# Patient Record
Sex: FEMALE | Race: WHITE | Employment: OTHER | URBAN - METROPOLITAN AREA
[De-identification: names, ages, dates, MRNs, and addresses within clinical notes are randomized per-mention and may not be internally consistent; named-entity substitution may affect disease eponyms.]

---

## 2020-10-15 ENCOUNTER — OFFICE VISIT (OUTPATIENT)
Dept: NEUROLOGY | Facility: CLINIC | Age: 65
End: 2020-10-15
Payer: MEDICARE

## 2020-10-15 ENCOUNTER — TELEPHONE (OUTPATIENT)
Dept: NEUROLOGY | Facility: CLINIC | Age: 65
End: 2020-10-15

## 2020-10-15 ENCOUNTER — HOSPITAL ENCOUNTER (OUTPATIENT)
Dept: GENERAL RADIOLOGY | Facility: HOSPITAL | Age: 65
Discharge: HOME | End: 2020-10-15
Attending: PHYSICAL MEDICINE & REHABILITATION
Payer: MEDICARE

## 2020-10-15 VITALS — WEIGHT: 150 LBS | BODY MASS INDEX: 25.61 KG/M2 | HEIGHT: 64 IN

## 2020-10-15 DIAGNOSIS — M70.50 PES ANSERINE BURSITIS: Primary | ICD-10-CM

## 2020-10-15 DIAGNOSIS — M17.11 LOCALIZED OSTEOARTHRITIS OF RIGHT KNEE: ICD-10-CM

## 2020-10-15 DIAGNOSIS — M22.2X1 PATELLOFEMORAL PAIN SYNDROME OF RIGHT KNEE: ICD-10-CM

## 2020-10-15 DIAGNOSIS — M25.561 CHRONIC PAIN OF RIGHT KNEE: ICD-10-CM

## 2020-10-15 DIAGNOSIS — G89.29 CHRONIC PAIN OF RIGHT KNEE: ICD-10-CM

## 2020-10-15 DIAGNOSIS — M70.50 PES ANSERINE BURSITIS: ICD-10-CM

## 2020-10-15 PROCEDURE — 99204 OFFICE O/P NEW MOD 45 MIN: CPT | Performed by: PHYSICAL MEDICINE & REHABILITATION

## 2020-10-15 PROCEDURE — 73564 X-RAY EXAM KNEE 4 OR MORE: CPT | Performed by: PHYSICAL MEDICINE & REHABILITATION

## 2020-10-15 RX ORDER — MULTIVIT-MIN/IRON FUM/FOLIC AC 7.5 MG-4
TABLET ORAL
COMMUNITY

## 2020-10-15 NOTE — H&P
[address] H&P    Requesting Physician: None Pcp    Chief Complaint (Reason for Visit):  Patient presents with:  Knee Pain: Pt is present with right knee pain and states that it has been going on for (MULTI-VITAMIN/MINERALS) Oral Tab Take  by mouth. • Diclofenac Sodium 1 % Transdermal Gel Apply 4 g topically 4 (four) times daily for 25 doses.  1 Tube 0        ALLERGIES:   Not on File      REVIEW OF SYSTEMS:   Review of Systems   Constitutional: Lela Reas line pain or \"catch\"      Gait:  Normal    Data  No results found for any previous visit.   ]      Radiology Imaging:    No image results found.       ASSESSMENT AND PLAN:  The patient is a 53-year-old female who presents with right-sided medial knee pain

## 2020-10-15 NOTE — TELEPHONE ENCOUNTER
Spoke to patient who stated that Dr. Nena Acevedo told her he wants to hold off on injection and try the medication first. So she will hold off on injection until further notice from Dr. Nena Acevedo.

## 2020-10-15 NOTE — PATIENT INSTRUCTIONS
1) Get XR of the right knee today on your way out    2) Use Voltaren gel 4 times per day over the affected area. Tylenol 500-1000 mg every 6-8 hours as needed for pain. No more than 3000 mg daily.   3) Begin formal physical therapy as soon as possible  4) irritation and gives the bursa time to heal.  · Sleeping with a cushion between the thighs . This limits pressure on the bursa. · Prescription or over-the-counter medicines . These help reduce inflammation, swelling, and pain.  NSAIDs (nonsteroidal anti-in

## 2020-10-15 NOTE — TELEPHONE ENCOUNTER
Medicare Online for insurance coverage of RIGHT pes anserine bursa CSI under ultrasound guidance. cpt codes 60150, [id number], ( if needed). iInsurance was verified and procedure is a covered benefit. Authorization is not required. Will inform Nursing.

## 2021-08-24 ENCOUNTER — TELEPHONE (OUTPATIENT)
Dept: NEUROLOGY | Facility: CLINIC | Age: 66
End: 2021-08-24

## 2021-08-25 ENCOUNTER — MED REC SCAN ONLY (OUTPATIENT)
Dept: NEUROLOGY | Facility: CLINIC | Age: 66
End: 2021-08-25

## 2021-08-25 ENCOUNTER — TELEPHONE (OUTPATIENT)
Dept: NEUROLOGY | Facility: CLINIC | Age: 66
End: 2021-08-25

## 2021-08-25 ENCOUNTER — OFFICE VISIT (OUTPATIENT)
Dept: NEUROLOGY | Facility: CLINIC | Age: 66
End: 2021-08-25
Payer: MEDICARE

## 2021-08-25 VITALS
BODY MASS INDEX: 25.61 KG/M2 | HEART RATE: 76 BPM | DIASTOLIC BLOOD PRESSURE: 64 MMHG | WEIGHT: 150 LBS | HEIGHT: 64 IN | SYSTOLIC BLOOD PRESSURE: 128 MMHG | OXYGEN SATURATION: 98 %

## 2021-08-25 DIAGNOSIS — M25.561 CHRONIC PAIN OF RIGHT KNEE: ICD-10-CM

## 2021-08-25 DIAGNOSIS — M17.11 LOCALIZED OSTEOARTHRITIS OF RIGHT KNEE: ICD-10-CM

## 2021-08-25 DIAGNOSIS — G89.29 CHRONIC PAIN OF RIGHT KNEE: ICD-10-CM

## 2021-08-25 DIAGNOSIS — M70.50 PES ANSERINE BURSITIS: Primary | ICD-10-CM

## 2021-08-25 DIAGNOSIS — M70.51 PES ANSERINUS BURSITIS OF RIGHT KNEE: ICD-10-CM

## 2021-08-25 DIAGNOSIS — M22.2X1 PATELLOFEMORAL PAIN SYNDROME OF RIGHT KNEE: ICD-10-CM

## 2021-08-25 DIAGNOSIS — M67.979 TIBIALIS POSTERIOR TENDINOPATHY: ICD-10-CM

## 2021-08-25 PROCEDURE — 99214 OFFICE O/P EST MOD 30 MIN: CPT | Performed by: PHYSICAL MEDICINE & REHABILITATION

## 2021-08-25 PROCEDURE — 20611 DRAIN/INJ JOINT/BURSA W/US: CPT | Performed by: PHYSICAL MEDICINE & REHABILITATION

## 2021-08-25 RX ORDER — TRIAMCINOLONE ACETONIDE 40 MG/ML
40 INJECTION, SUSPENSION INTRA-ARTICULAR; INTRAMUSCULAR ONCE
Status: COMPLETED | OUTPATIENT
Start: 2021-08-25 | End: 2021-08-25

## 2021-08-25 RX ORDER — LIDOCAINE HYDROCHLORIDE 10 MG/ML
7 INJECTION, SOLUTION INFILTRATION; PERINEURAL ONCE
Status: COMPLETED | OUTPATIENT
Start: 2021-08-25 | End: 2021-08-25

## 2021-08-25 NOTE — PATIENT INSTRUCTIONS
My office will call you to schedule the RIGHT pes anserine bursa under ultrasound guidance once the procedure is approved by your insurance carrier. Try the [employer] L420 orthotics. These can be purchased online.  Take the insert out of your shoe and rep

## 2021-08-25 NOTE — TELEPHONE ENCOUNTER
Per Medicare Guidelines -no authorization is required for HA injection   Viscosupplementation with Hyaluronans will only be covered for Osteoarthritis of the knee or shoulder joint when radiological evidence to support the diagnosis of osteoarthritis; and

## 2021-08-25 NOTE — PROGRESS NOTES
[address]  Progress Note    CHIEF COMPLAINT:  Patient presents with:  Knee Pain: LOV: 10/15/2020 Right knee pain. Pain is on the medial side of the knee and inflammed. Denies tinling and numbness.  Pain can b Take 81 mg by mouth daily. • Multiple Vitamins-Minerals (MULTI-VITAMIN/MINERALS) Oral Tab Take  by mouth. ALLERGIES:   No Known Allergies    REVIEW OF SYSTEMS:   Review of Systems   Constitutional: Negative. HENT: Negative.     Eyes: Negative over patella  Lachmans: negative for instability  Anterior / Posterior drawer: negative for instability  Valgus/Varus stress test: negative for instablity  Judith Test: negative for medial or lateral joint line pain or \"catch\"      Gait:  Supination she ambulates and is likely contributing to the stressors over the posterior tibialis leading to posterior tibialis insufficiency. I am recommending orthotics which she should start using and see if this will help. She should also use a firmer shoe.   I w

## 2021-08-25 NOTE — PROCEDURES
[address]  Pes Anserine Bursa Injection Procedure Note    CHIEF COMPLAINT:  Patient presents with:  Knee Pain: LOV: 10/15/2020 Right knee pain. Pain is on the medial side of the knee and inflammed.  Denies ti INSTRUCTIONS GIVEN TO PATIENT:    \"You will see an effect in the next 2-3 days.   Please contact me if you have fevers, worsening swelling, worsening pain, decreased range of motion, increased redness, chills, or anything that makes you concerned abo

## 2021-08-25 NOTE — TELEPHONE ENCOUNTER
Per Medicare guidelines authroization is not required for RIGHT past anserine bursa corticosteroid injection under ultrasound guidance cpt codes 65630, . Will inform Nursing.

## 2023-10-26 ENCOUNTER — OFFICE VISIT (OUTPATIENT)
Dept: PHYSICAL MEDICINE AND REHAB | Facility: CLINIC | Age: 68
End: 2023-10-26

## 2023-10-26 ENCOUNTER — TELEPHONE (OUTPATIENT)
Dept: PHYSICAL MEDICINE AND REHAB | Facility: CLINIC | Age: 68
End: 2023-10-26

## 2023-10-26 ENCOUNTER — HOSPITAL ENCOUNTER (OUTPATIENT)
Dept: GENERAL RADIOLOGY | Facility: HOSPITAL | Age: 68
Discharge: HOME | End: 2023-10-26
Attending: PHYSICAL MEDICINE & REHABILITATION

## 2023-10-26 DIAGNOSIS — M22.2X9 PATELLOFEMORAL PAIN SYNDROME, UNSPECIFIED LATERALITY: ICD-10-CM

## 2023-10-26 DIAGNOSIS — S83.412A SPRAIN OF MEDIAL COLLATERAL LIGAMENT OF LEFT KNEE, INITIAL ENCOUNTER: ICD-10-CM

## 2023-10-26 DIAGNOSIS — M17.10 PRIMARY OSTEOARTHRITIS OF KNEE, UNSPECIFIED LATERALITY: ICD-10-CM

## 2023-10-26 DIAGNOSIS — M22.2X9 PATELLOFEMORAL PAIN SYNDROME, UNSPECIFIED LATERALITY: Primary | ICD-10-CM

## 2023-10-26 PROCEDURE — 99214 OFFICE O/P EST MOD 30 MIN: CPT | Performed by: PHYSICAL MEDICINE & REHABILITATION

## 2023-10-26 PROCEDURE — 73564 X-RAY EXAM KNEE 4 OR MORE: CPT | Performed by: PHYSICAL MEDICINE & REHABILITATION

## 2023-10-26 RX ORDER — NAPROXEN 500 MG/1
500 TABLET ORAL 2 TIMES DAILY WITH MEALS
Qty: 60 TABLET | Refills: 0 | Status: SHIPPED | OUTPATIENT
Start: 2023-10-26

## 2023-10-26 NOTE — PATIENT INSTRUCTIONS
1) My office will call you to schedule the LEFT knee CSI under ultrasound guidance once the procedure is approved by your insurance carrier. 2) Please get X-rays of the LEFT knee today on your way out. Lets plan for Monday morning the 30th at 10 am in [place] office. 3) Please call and schedule your MRI at [phone]. Once you have your MRI scheduled, then call my office again to schedule a follow-up visit soon after your MRI so we may review the images together. 4) Please begin physical therapy as soon as possible. 5) Follow up with me when you return from you're trip to review the MRI images. 6) Purchase a hinged knee brace on [employer] (should be about $30)  7) Take Naprosyn 500 mg 1 tablet twice per day with food for the next two weeks and then as needed but no more than 2 tablets per day. Do not take with any other NSAIDS (Ibuprofen, Advil, Aleve, etc). OK to take Tylenol 500 mg every 6 hours as needed for pain. If you develop any side effects including stomach aches, nausea, vomiting, or other gastrointestinal symptoms, stop the medication and call my office. 8) Tylenol 500-1000 mg every 6-8 hours as needed for pain. No more than 3000 mg daily.

## 2023-10-26 NOTE — TELEPHONE ENCOUNTER
Per CMS Guidelines -no authorization is required for LEFT knee CSI under ultrasound guidance CPT 06807,     Status: Authorization is not required based on medical necessity however may be subject to review once claim is submitted-Covered Benefit

## 2023-10-26 NOTE — PROGRESS NOTES
[address]  Progress Note    CHIEF COMPLAINT:  Patient presents with:  Knee Pain: LOV: 8/25/2021 pt comes in with medial L knee aching aching pain. Denies T/N. Denies weakness. Rates pain 0/10 while sitting, 7/10 while walking. Takes Advil and Tylenol PRN. She had RIGHT pes anserine bursae corticosteroid injection at LOV. Admits 100%  for 2 years. History of Present Illness: The patient is a 69year old right-handed female with no significant past medical history who presents with left knee pain that has been ongoing for the last few weeks without an inciting event. She does relate this to significant amount of walking during her most recent vacation. She rates the pain a 7 out of 10 while walking and a 0 out of 10 while standing. The symptoms are mostly in the medial aspect of the left knee. She denies any inciting event or injury. She has been taking Tylenol and ibuprofen. No x-rays have been performed. She is not using a brace. PAST MEDICAL HISTORY:  History reviewed. No pertinent past medical history. SURGICAL HISTORY:  History reviewed. No pertinent surgical history. SOCIAL HISTORY:   Social History    Occupational History      Not on file    Tobacco Use      Smoking status: Not on file      Smokeless tobacco: Not on file    Substance and Sexual Activity      Alcohol use: Not on file      Drug use: Not on file      Sexual activity: Not on file      FAMILY HISTORY:   History reviewed. No pertinent family history. CURRENT MEDICATIONS:   Current Outpatient Medications   Medication Sig Dispense Refill    naproxen (NAPROSYN) 500 MG Oral Tab Take 1 tablet (500 mg total) by mouth 2 (two) times daily with meals. Take for 2 weeks as directed and then as needed. 60 tablet 0    Calcium Citrate Does not apply Powder Take by mouth. Aspirin Buf,CaCarb-MgCarb-MgO, 81 MG Oral Tab Take 81 mg by mouth daily.       Multiple Vitamins-Minerals (MULTI-VITAMIN/MINERALS) Oral Tab Take  by mouth. BIOTIN FORTE OR Take by mouth. (Patient not taking: Reported on 10/26/2023)         ALLERGIES:   No Known Allergies    REVIEW OF SYSTEMS:   Review of Systems   Constitutional: Negative. HENT: Negative. Eyes: Negative. Respiratory: Negative. Cardiovascular: Negative. Gastrointestinal: Negative. Genitourinary: Negative. Musculoskeletal: As per HPI  Skin: Negative. Neurological: As per HPI  Endo/Heme/Allergies: Negative. Psychiatric/Behavioral: Negative. All other systems reviewed and are negative. Pertinent positives and negatives noted in the HPI. PHYSICAL EXAM:   There were no vitals taken for this visit. There is no height or weight on file to calculate BMI. General: No immediate distress  Head: Normocephalic/ Atraumatic  Eyes: Extra-occular movements intact. Ears: No auricular hematoma or deformities  Mouth: No lesions or ulcerations  Heart: peripheral pulses intact. Normal capillary refill. Lungs: Non-labored respirations  Abdomen: No abdominal guarding  Extremities: No lower extremity edema bilaterally   Skin: No lesions noted. Cognition: alert & oriented x 3, attentive, able to follow 2 step commands, comprehention intact, spontaneous speech intact  Motor:    Musculoskeletal:    KNEE:  Inspection: no erythema, swelling, or obvious deformity  Palpation: Tender palpation over the left medial joint line as well as MCL  ROM: Full active ROM in flexion and extension with pain  Evans Test: negative for pain over patella  Lachmans: negative for instability  Anterior / Posterior drawer: negative for instability  Valgus/Varus stress test: Positive for pain during valgus stress test on the left  Judith Test: Positive on the left      Gait:  Normal    Data  No visits with results within 6 Month(s) from this visit.    Latest known visit with results is:   No results found for any previous visit.   ]      Radiology Imaging:  I reviewed with the patient her X-ray of the knees left from 10/26/2023  XR KNEE, COMPLETE (4 OR MORE VIEWS), LEFT (SNE=[id number])  Narrative: PROCEDURE: XR KNEE, COMPLETE (4 OR MORE VIEWS), LEFT (GHW=[id number])     COMPARISON: None. INDICATIONS: Left medial knee pain for several months without injury. TECHNIQUE: 4 views were obtained. FINDINGS:   No acute fracture or dislocation. Small joint effusion. Mild-to-moderate degenerative joint disease. Soft tissues are unremarkable. Impression: CONCLUSION:      No acute fracture or dislocation. Small joint effusion. Mild-to-moderate degenerative joint disease. Dictated by (CST): Jacky Goss MD on 10/26/2023 at 3:26 PM       Finalized by (CST): Jacky Goss MD on 10/26/2023 at 3:28 PM              ASSESSMENT AND PLAN:  Aniya Infante is a pleasant 68-year-old female presents with left-sided medial knee pain. She does have a positive valgus stress test with tenderness to palpation over the left MCL and medial joint line. I believe her symptoms may be due to an MCL sprain along with a meniscal injury although a subchondral insufficiency fracture is possible as well. She also has patellofemoral syndrome and osteoarthritis. I am recommending an x-ray as well as an MRI of the left knee. She should use a hinged knee brace. I am recommending a left knee corticosteroid injection under ultrasound guidance. We will also begin formal physical therapy as soon as possible. We will follow-up when she returns from her trip and has the MRI performed but sooner for the left knee injection    RTC in next week for knee injection  Discharge Instructions were provided as documented in AVS summary. The patient was in agreement with the assessment and plan. All questions were answered. There were no barriers to learning.        Patellofemoral pain syndrome, unspecified laterality  (primary encounter diagnosis)  Primary osteoarthritis of knee, unspecified laterality  Sprain of medial collateral ligament of left knee, initial encounter    Alex B. Tyronne Spatz MD  Physical Medicine and Rehabilitation/Sports Medicine  [facility]

## 2023-10-30 ENCOUNTER — TELEPHONE (OUTPATIENT)
Dept: PHYSICAL MEDICINE AND REHAB | Facility: CLINIC | Age: 68
End: 2023-10-30

## 2023-10-30 ENCOUNTER — OFFICE VISIT (OUTPATIENT)
Dept: PHYSICAL MEDICINE AND REHAB | Facility: CLINIC | Age: 68
End: 2023-10-30

## 2023-10-30 DIAGNOSIS — M22.2X9 PATELLOFEMORAL PAIN SYNDROME, UNSPECIFIED LATERALITY: Primary | ICD-10-CM

## 2023-10-30 DIAGNOSIS — M17.10 PRIMARY OSTEOARTHRITIS OF KNEE, UNSPECIFIED LATERALITY: ICD-10-CM

## 2023-10-30 DIAGNOSIS — S83.412A SPRAIN OF MEDIAL COLLATERAL LIGAMENT OF LEFT KNEE, INITIAL ENCOUNTER: ICD-10-CM

## 2023-10-30 PROCEDURE — 20611 DRAIN/INJ JOINT/BURSA W/US: CPT | Performed by: PHYSICAL MEDICINE & REHABILITATION

## 2023-10-30 RX ORDER — LIDOCAINE HYDROCHLORIDE 10 MG/ML
9 INJECTION, SOLUTION INFILTRATION; PERINEURAL ONCE
Status: COMPLETED | OUTPATIENT
Start: 2023-10-30 | End: 2023-10-30

## 2023-10-30 RX ORDER — TRIAMCINOLONE ACETONIDE 40 MG/ML
40 INJECTION, SUSPENSION INTRA-ARTICULAR; INTRAMUSCULAR ONCE
Status: COMPLETED | OUTPATIENT
Start: 2023-10-30 | End: 2023-10-30

## 2023-10-30 RX ADMIN — LIDOCAINE HYDROCHLORIDE 9 ML: 10 INJECTION, SOLUTION INFILTRATION; PERINEURAL at 13:17:00

## 2023-10-30 RX ADMIN — TRIAMCINOLONE ACETONIDE 40 MG: 40 INJECTION, SUSPENSION INTRA-ARTICULAR; INTRAMUSCULAR at 13:17:00

## 2023-10-30 NOTE — PATIENT INSTRUCTIONS
Post Injection Instructions     Please do not do anything strenuous over the next two days (if you had a knee injection do not walk more than 2 city blocks, do not attend any aerobic classes, do not run, no heavy lifting, no prolong standing). You may resume your day to day activities after your injection. You may experience some mild amount of swelling after the procedure. Please ice your joint that was injected at least 5-6 times a day (15 minutes) for two days after (this will help prevent worsening pain that sometimes occurs after an injection). Only take tylenol if needed for pain for the first few days. Watch for signs of infection which include redness, warmth, worsening pain, fevers or chills. If you develop any of these signs call the office immediately at [phone]    Everyone responds differently to injections, but you can expect your peak effects a few weeks after your last injection. Morenita Newman.  Autumn Ralph MD  Physical Medicine and Rehabilitation/Sports Medicine  [facility]

## 2023-10-30 NOTE — TELEPHONE ENCOUNTER
Pt calling to ask if we can specify on the order if her Left knee MRI is with or without contrast as pt cannot get scheduled.

## 2023-10-30 NOTE — PROCEDURES
[address]   Knee Joint Injection Procedure Note    CHIEF COMPLAINT:  Patient presents with:  Knee Pain: Patient comes in for left knee injection. Rates pain 8/10 in am.       PROCEDURE PERFORMED:  Left knee intra-articular corticosteroid injection under ultrasound guidance    INDICATIONS:  Left knee pain and osteoarthritis    PRIMARY PROCEDURALIST:  Patricia Crawford MD    INFORMED CONSENT & TIME OUT:   As documented in the Time Out and Pre-Procedure Check Lists. Verbal consent was obtained    Vitals: [unfilled]  Labs (document last wbc, plts, hgb, and PT/INR):    No visits with results within 6 Month(s) from this visit. Latest known visit with results is:   No results found for any previous visit.   ]    PROCEDURE:  The procedure, risks, benefits, and alternatives were discussed with the patient. The patient verbalized understanding and gave verbal consent. The Left knee was prepped and draped in the standard sterile fashion using 3 sticks of Betadine. Using a linear high frequency probe, the suprapatellar recess and bursa was visualized. A 18-gauge 1.5 inch needle with a 5 cc syringe containing 5 cc of 1% lidocaine was introduced through the superolateral approach and was seen entering the the joint capsule to anesthetize the skin and soft tissue. 5 cc of 1% lidocaine was used to anesthetize the skin and soft tissue. Aspiration was attempted with 13 cc of fluid aspirated. The syringe was switched out and a mixture of 4 cc 1% lidocaine and 1 cc of Kenalog containing 40 mg of corticosteroid was visualized being injected into the intra-articular space. The needle was then removed, hemostasis was obtained, Band-Aid was applied. Patient tolerated the procedure well. The patient was able to get up and ambulate. Patient verbalized understanding of assessment and plan. Patient is in agreement with the plan. All questions were answered.   No barriers to learning identified. Permanent pictures were saved. INSTRUCTIONS GIVEN TO PATIENT:    \"You will see an effect in the next 2-3 days. Please contact me if you have fevers, worsening swelling, worsening pain, decreased range of motion, increased redness, chills, or anything that makes you concerned about how the joint we injected feels/looks. If you do not reach me in a reasonable time, please report directly to the emergency room for further evaluation\"      Louisa Morris.  Erica Arroyo MD, [facility]  Physical Medicine and Rehabilitation/Sports Medicine  MEDICAL [facility]ENTER [facility]

## 2023-10-30 NOTE — TELEPHONE ENCOUNTER
Per Dr Trenna Barthel MRI of left knee should be without contrast. New updated order placed. A copy of the referral sent to patient via [employer]. Called patient as well and let her know.

## 2023-11-20 ENCOUNTER — TELEPHONE (OUTPATIENT)
Dept: PHYSICAL MEDICINE AND REHAB | Facility: CLINIC | Age: 68
End: 2023-11-20

## 2023-11-21 ENCOUNTER — TELEMEDICINE (OUTPATIENT)
Dept: PHYSICAL MEDICINE AND REHAB | Facility: CLINIC | Age: 68
End: 2023-11-21
Payer: MEDICARE

## 2023-11-21 DIAGNOSIS — M81.0 AGE-RELATED OSTEOPOROSIS WITHOUT CURRENT PATHOLOGICAL FRACTURE: ICD-10-CM

## 2023-11-21 DIAGNOSIS — S83.412A SPRAIN OF MEDIAL COLLATERAL LIGAMENT OF LEFT KNEE, INITIAL ENCOUNTER: ICD-10-CM

## 2023-11-21 DIAGNOSIS — M17.10 PRIMARY OSTEOARTHRITIS OF KNEE, UNSPECIFIED LATERALITY: ICD-10-CM

## 2023-11-21 DIAGNOSIS — M22.2X9 PATELLOFEMORAL PAIN SYNDROME, UNSPECIFIED LATERALITY: Primary | ICD-10-CM

## 2023-11-21 PROCEDURE — 99214 OFFICE O/P EST MOD 30 MIN: CPT | Performed by: PHYSICAL MEDICINE & REHABILITATION

## 2023-11-21 RX ORDER — CALCITONIN SALMON 200 [IU]/.09ML
1 SPRAY, METERED NASAL DAILY
Qty: 1 EACH | Refills: 0 | Status: SHIPPED | OUTPATIENT
Start: 2023-11-21

## 2023-11-21 NOTE — PROGRESS NOTES
[address]  Video Visit Progress Note      Telehealth outside of [address] Verbal Consent   I conducted a telehealth visit with Esther Adams today, 11/21/23, which was completed using two-way, real-time interactive audio and video communication. This has been done in good rosa m to provide continuity of care in the best interest of the provider-patient relationship, due to the COVID -19 public health crisis/national emergency where restrictions of face-to-face office visits are ongoing. Every conscious effort was taken to allow for sufficient and adequate time to complete the visit. The patient was made aware of the limitations of the telehealth visit, including treatment limitations as no physical exam could be performed. The patient was advised to call 911 or to go to the ER in case there was an emergency. The patient was also advised of the potential privacy & security concerns related to the telehealth platform. The patient was made aware of where to find [facility] notice of privacy practices, telehealth consent form and other related consent forms and documents. which are located on the [facility] website. The patient verbally agreed to telehealth consent form, related consents and the risks discussed. Lastly, the patient confirmed that they were in [place]. Included in this visit, time may have been spent reviewing labs, medications, radiology tests and decision making. Appropriate medical decision-making and tests are ordered as detailed in the plan of care above. Coding/billing information is submitted for this visit based on complexity of care and/or time spent for the visit. CHIEF COMPLAINT:    Chief Complaint   Patient presents with    Knee Pain    Imaging       History of Present Illness: The patient is a 77year old right-handed female with no significant past medical history who presents with left knee pain.   She is status post left knee hyaluronic acid and corticosteroid injection which has helped. She also had an MRI of the left knee performed which I independently reviewed. She is concerned with her history of osteoporosis and is going to begin treatment soon for this. PAST MEDICAL HISTORY:  History reviewed. No pertinent past medical history. SURGICAL HISTORY:  History reviewed. No pertinent surgical history. SOCIAL HISTORY:   Social History     Occupational History    Not on file   Tobacco Use    Smoking status: Not on file    Smokeless tobacco: Not on file   Substance and Sexual Activity    Alcohol use: Not on file    Drug use: Not on file    Sexual activity: Not on file       FAMILY HISTORY:   History reviewed. No pertinent family history. CURRENT MEDICATIONS:   Current Outpatient Medications   Medication Sig Dispense Refill    calcitonin, salmon, 200 UNIT/ACT Nasal Solution 1 spray by Nasal route daily. 1 each 0    naproxen (NAPROSYN) 500 MG Oral Tab Take 1 tablet (500 mg total) by mouth 2 (two) times daily with meals. Take for 2 weeks as directed and then as needed. 60 tablet 0    BIOTIN FORTE OR Take by mouth. (Patient not taking: Reported on 10/26/2023)      Calcium Citrate Does not apply Powder Take by mouth. Aspirin Buf,CaCarb-MgCarb-MgO, 81 MG Oral Tab Take 81 mg by mouth daily. Multiple Vitamins-Minerals (MULTI-VITAMIN/MINERALS) Oral Tab Take  by mouth. ALLERGIES:   No Known Allergies    REVIEW OF SYSTEMS:   Review of Systems   Constitutional: Negative. HENT: Negative. Eyes: Negative. Respiratory: Negative. Cardiovascular: Negative. Gastrointestinal: Negative. Genitourinary: Negative. Musculoskeletal: As per HPI  Skin: Negative. Neurological: As per HPI  Endo/Heme/Allergies: Negative. Psychiatric/Behavioral: Negative. All other systems reviewed and are negative. Pertinent positives and negatives noted in the HPI.         PHYSICAL EXAM:     There is no height or weight on file to calculate BMI. General: No immediate distress  Head: Normocephalic/ Atraumatic  Eyes: Extra-occular movements intact  Ears/Nose/Throat:  External appearance identifies normal appearance without obvious deformity  Cardiovascular: No cyanosis, clubbing or edema  Respiratory: Non-labored respirations  Skin: No lesions noted   Neurological: alert & oriented x 3, attentive, able to follow commands, comprehention intact, spontaneous speech intact  Psychiatric: Mood and affect appropriate        Data  No visits with results within 6 Month(s) from this visit. Latest known visit with results is:   No results found for any previous visit.   ]      Radiology Imaging:  I reviewed with the patient her X-ray, MRI, and Dexa Scan of the knees left   XR KNEE, COMPLETE (4 OR MORE VIEWS), LEFT (HKZ=[id number])  Narrative: PROCEDURE: XR KNEE, COMPLETE (4 OR MORE VIEWS), LEFT (MCD=[id number])     COMPARISON: None. INDICATIONS: Left medial knee pain for several months without injury. TECHNIQUE: 4 views were obtained. FINDINGS:   No acute fracture or dislocation. Small joint effusion. Mild-to-moderate degenerative joint disease. Soft tissues are unremarkable. Impression: CONCLUSION:      No acute fracture or dislocation. Small joint effusion. Mild-to-moderate degenerative joint disease. Dictated by (CST): Lazaro Russell MD on 10/26/2023 at 3:26 PM       Finalized by (CST): Lazaro Russell MD on 10/26/2023 at 3:28 PM            EXAM: MRI KNEE W/O CONTRAST, LEFT     INDICATION:  M22.2X9: Patellofemoral stress syndrome     COMPARISON: None     TECHNIQUE: Multiplanar MRI sequences were performed of the left knee without intravenous gadolinium, including axial T2 fat sat, coronal PD fat-sat, coronal T1, sagittal PD, and sagittal T2 fat-sat sequences. FINDINGS:   Menisci: Complex multidirectional tear posterior horn and body medial meniscus extending to the posterior ligament.  Intrasubstance degeneration with age-indeterminate horizontal superior surface tearing in the body and anterior horn of the lateral meniscus with possible multidirectional tearing in the anterior root. Ligaments: Anterior cruciate ligament and posterior cruciate ligament intact. Thickening and intermediate signal intensity proximal MCL. LCL complex intact. Tendons: Extensor mechanism intact. Popliteus tendon intact. Tendinosis and low-grade interstitial tearing of the distal semimembranosus tendon. Bones/Cartilage: Moderate bone marrow edema the medial tibial plateau and medial femoral condyle with T1 hypointense linear signal intensity along the tibial plateau subchondral bone plate and peripheral aspect medial femoral condyle, suggestive of nondisplaced insufficiency fractures. Diffuse full-thickness cartilage loss along the patellar apex extending into the medial lateral facets with underlying degenerative marrow change. Other focal areas of high-grade fissuring and foci of subchondral marrow change along the patella. Multifocal areas of full-thickness/near full-thickness cartilage loss along the weightbearing medial tibiofemoral compartment with underlying marrow change. Focal areas of grade 2 and 3 cartilage thinning along the weightbearing lateral tibiofemoral compartment with scattered deep fissuring. Mild tricompartmental osteophyte formation. Miscellaneous: Small knee joint effusion. Trace Ayoub's cyst. Prepatellar and infrapatellar subcutaneous soft tissue edema. IMPRESSION   IMPRESSION:   1. Complex multidirectional tear posterior horn and body medial meniscus with a dominant radial component at the posterior root. 2.  Age-indeterminate horizontal superior surface tearing in the body and anterior horn of the lateral meniscus extending to the anterior root with intrasubstance degeneration.    3.  Subchondral insufficiency fractures involving the medial tibial plateau and peripheral medial femoral condyle. 4.  Tricompartmental osteoarthritis is severe in the patellofemoral and medial tibiofemoral compartments. 5.  Reactive periligamentous edema along the MCL versus grade 1 sprain in the setting of trauma. 6.  Interstitial tearing and tendinosis of the distal semimembranosus tendon. Dictating Resident/Fellow/Attending Radiologist: Claudia Lobato  11/14/2023 8:23 AM     This report is dictated with a resident or fellow. I personally reviewed the study and interpretation and agree with the findings documented in the report. Electronically Verified and Signed by Attending Radiologist: Eveline Jones MD 11/14/2023 12:35 PM   This exam was dictated at [address] [place] [address]: Postmenopausal female for osteoporosis evaluation. COMPARISON: None available     TECHNIQUE: DXA scan was performed using Hologic densitometer. FINDINGS:   Lumbar Spine: L1-L4   BMD = 0.704 g/cm2   T-score = -3.1     Left Femoral Neck:   BMD = 0.543 g/cm2   T-score = -2.8     Left Total Hip:   BMD = 0.681 g/cm2     T-score = -2.1     FRAX:  FRAX is not reported due to osteoporosis. IMPRESSION:  Osteoporosis based on the region of lowest bone mineral density. The T-score compares the bone density of a postmenopausal female or male 48years of age or older to a young adult standard. Osteoporosis is defined as a T-score of -2.5 or less, low bone mass (osteopenia) as a T-score from -1.1 to -2.4, and normal -1.0 and above. Postmenopausal women and men age 48 and older presenting with the following should be considered for treatment per [facility]:   Hip or vertebral fracture (clinically apparent or found on vertebral imaging). T-score < or equal to -2.5 at the femoral neck, total hip or lumbar spine.    Low bone mass (T-score between -1.0 and -2.5 at the femoral neck or lumbar spine) and a 10-year probability of a hip fracture > or equal to 3% or a 10-year probability of a major osteoporosis-related fracture > or equal to 20% based on the [place]-adapted FRAX algorithm. FINAL REPORT   Attending Radiologist:  Sixto Blizzard MD   Date Signed Off:  06/28/2023 17:40   ASSESSMENT AND PLAN:  Nneka Read is a pleasant 18-year-old female who presents for follow-up of her left knee pain. I have independently reviewed her MRI where she does have degenerative changes with subchondral insufficiency fracture of the medial tibial plateau and peripheral medial femoral condyle along with an MCL sprain. I have recommended she start calcitonin 200 units intranasally to help heal with the fracture given her history of osteoporosis. I have also recommended she continue with physical therapy. She can try using a smaller hinged knee brace and she will follow-up with me in 1 month. RTC in 1 month  Discharge Instructions were provided as documented in AVS summary. The patient was in agreement with the assessment and plan. All questions were answered. There were no barriers to learning. We discussed that a telemedicine visit is in place of an office visit; however, this limits the ability to perform a thorough physical examination which may affect objective findings related to a specific condition and can affect treatment. We also discussed that NSAIDs may mask or worsen COVID-19 infection symptoms. The patient was also informed that corticosteroids, in any form, may significantly decrease immune response and may increase risk and complications of infection. The patient was advised that given the current situation with COVID-19, it is in his/her best interest to socially distance his/herself. Given this, we are not recommending any elective procedures or office visits at the outpatient surgery center or in the office respectively unless deemed necessary.   My staff will be reaching out to the patient for the elective procedure when it is considered appropriate and the patient can follow-up in office as well when appropriate. In the meantime, I will be available for telephone and video encounter. 1. Patellofemoral pain syndrome, unspecified laterality    2. Primary osteoarthritis of knee, unspecified laterality    3. Sprain of medial collateral ligament of left knee, initial encounter    4. Age-related osteoporosis without current pathological fracture        Mauricio Coronel MD  Physical Medicine and Rehabilitation/Sports Medicine  [facility]

## 2023-11-21 NOTE — PATIENT INSTRUCTIONS
1) continue plan for physical therapy  2) continue trying to use knee brace.   Consider purchasing a smaller brace to see if it fits better  3) start calcitonin 200 units intranasally (1 spray in the nostril daily and alternate nostrils every day)  4) follow-up with me in 1 month

## 2025-07-18 RX ORDER — SCOPOLAMINE 1 MG/3D
1 PATCH, EXTENDED RELEASE TRANSDERMAL
COMMUNITY
Start: 2023-09-27 | End: 2025-07-21

## 2025-07-21 ENCOUNTER — OFFICE VISIT (OUTPATIENT)
Dept: PHYSICAL MEDICINE AND REHAB | Facility: CLINIC | Age: 70
End: 2025-07-21
Payer: MEDICARE

## 2025-07-21 ENCOUNTER — PATIENT MESSAGE (OUTPATIENT)
Dept: PHYSICAL MEDICINE AND REHAB | Facility: CLINIC | Age: 70
End: 2025-07-21

## 2025-07-21 ENCOUNTER — TELEPHONE (OUTPATIENT)
Dept: PHYSICAL MEDICINE AND REHAB | Facility: CLINIC | Age: 70
End: 2025-07-21

## 2025-07-21 VITALS — BODY MASS INDEX: 27.12 KG/M2 | WEIGHT: 155 LBS | HEIGHT: 63.5 IN

## 2025-07-21 DIAGNOSIS — S83.412A SPRAIN OF MEDIAL COLLATERAL LIGAMENT OF LEFT KNEE, INITIAL ENCOUNTER: ICD-10-CM

## 2025-07-21 DIAGNOSIS — M22.2X9 PATELLOFEMORAL PAIN SYNDROME, UNSPECIFIED LATERALITY: Primary | ICD-10-CM

## 2025-07-21 DIAGNOSIS — M81.0 AGE-RELATED OSTEOPOROSIS WITHOUT CURRENT PATHOLOGICAL FRACTURE: ICD-10-CM

## 2025-07-21 DIAGNOSIS — M17.10 PRIMARY OSTEOARTHRITIS OF KNEE, UNSPECIFIED LATERALITY: ICD-10-CM

## 2025-07-21 PROCEDURE — 99214 OFFICE O/P EST MOD 30 MIN: CPT | Performed by: PHYSICAL MEDICINE & REHABILITATION

## 2025-07-21 RX ORDER — ALENDRONATE SODIUM 70 MG/1
70 TABLET ORAL WEEKLY
COMMUNITY
Start: 2024-11-25

## 2025-07-21 RX ORDER — AMLODIPINE BESYLATE 5 MG/1
5 TABLET ORAL DAILY
COMMUNITY
Start: 2024-06-26

## 2025-07-21 RX ORDER — LEVOTHYROXINE SODIUM 112 UG/1
112 TABLET ORAL DAILY
COMMUNITY
Start: 2025-06-18

## 2025-07-21 RX ORDER — AMOXICILLIN 500 MG/1
500 TABLET, FILM COATED ORAL 2 TIMES DAILY
COMMUNITY
Start: 2025-07-16 | End: 2025-07-26

## 2025-07-21 NOTE — PATIENT INSTRUCTIONS
1) Please get X-rays of the LEFT knee today on your way out.   2) My office will call you to schedule the LEFT knee HA and CSI under US once the procedure is approved by your insurance carrier.    3) Tylenol 500-1000 mg every 6-8 hours as needed for pain.  No more than 3000 mg daily.  4) Please begin physical therapy as soon as possible.

## 2025-07-21 NOTE — PROGRESS NOTES
The following individual(s) verbally consented to be recorded using ambient AI listening technology and understand that they can each withdraw their consent to this listening technology at any point by asking the clinician to turn off or pause the recording:    Patient name: Kiko Rox  Additional names:  Donnie Gramajo

## 2025-07-21 NOTE — PROGRESS NOTES
[facility]  Progress Note    CHIEF COMPLAINT:    Chief Complaint   Patient presents with    Follow - Up     LOV: 10/30/23 Pt here for f/u presents with Right knee pain, onset 3 mo ago. Rates pain 7/10. Denies NT, radiating pain, weakness, PT.  Current Medication: advil, tylenol  No imaging.  Patient gives verbal consent to use Abridge.        History of Present Illness  Kiko Gramajo is a 69 year old female with knee arthritis who presents with worsening left knee pain.    Over the past three months, she has experienced worsening pain in her left knee, described as severe with a rating of 7 to 8 out of 10 after prolonged walking. The pain is localized to the left knee and is occasionally accompanied by clicking sounds. There is no history of recent injury to the knee.    In 2021, she received an right pes injection that provided relief for approximately four years and underwent physical therapy, which was beneficial. Currently, she experiences significant discomfort, especially after walking for extended periods.    She is physically active, playing golf three times a week and attending cardiac rehabilitation sessions three times a week, where she uses ten-pound weights. She reports no discomfort in her shoulders during these activities.    She is planning to travel to [place] in ten days and is concerned about her ability to walk during the trip.       PAST MEDICAL HISTORY:  Past Medical History[1]    SURGICAL HISTORY:  Past Surgical History[2]    SOCIAL HISTORY:   Social History     Occupational History    Not on file   Tobacco Use    Smoking status: Not on file    Smokeless tobacco: Not on file   Substance and Sexual Activity    Alcohol use: Not on file    Drug use: Not on file    Sexual activity: Not on file       FAMILY HISTORY:   Family History[3]    CURRENT MEDICATIONS:   Current Medications[4]    ALLERGIES:   Allergies[5]    REVIEW OF SYSTEMS:   Review of  Systems   Constitutional: Negative.    HENT: Negative.    Eyes: Negative.    Respiratory: Negative.    Cardiovascular: Negative.    Gastrointestinal: Negative.    Genitourinary: Negative.    Musculoskeletal: As per HPI  Skin: Negative.    Neurological: As per HPI  Endo/Heme/Allergies: Negative.    Psychiatric/Behavioral: Negative.      All other systems reviewed and are negative. Pertinent positives and negatives noted in the HPI.    PHYSICAL EXAM:   Ht 63.5\"   Wt 155 lb (70.3 kg)   BMI 27.03 kg/m²     Body mass index is 27.03 kg/m².      General: No immediate distress  Head: Normocephalic/ Atraumatic  Eyes: Extra-occular movements intact.   Ears: No auricular hematoma or deformities  Mouth: No lesions or ulcerations  Heart: peripheral pulses intact. Normal capillary refill.   Lungs: Non-labored respirations  Abdomen: No abdominal guarding  Extremities: No lower extremity edema bilaterally   Skin: No lesions noted.   Cognition: alert & oriented x 3, attentive, able to follow 2 step commands, comprehension intact, spontaneous speech intact  Motor:    Musculoskeletal:    KNEE:  Inspection: no erythema, swelling, or obvious deformity  Palpation: Tender to palpation over the right medial joint line as well as medial and lateral patellar facet  ROM: Full active ROM in flexion and extension  Evans Test: negative for pain over patella  Lachmans: negative for instability  Anterior / Posterior drawer: negative for instability  Valgus/Varus stress test: negative for instablity  Judith Test: negative for medial or lateral joint line pain or \"catch\"      Data  No visits with results within 6 Month(s) from this visit.   Latest known visit with results is:   No results found for any previous visit.   ]      Radiology Imaging:  I reviewed with the patient her X-ray of the knees right from   XR KNEE, COMPLETE (4 OR MORE VIEWS), LEFT (CPT=73564)  Narrative: PROCEDURE: XR KNEE, COMPLETE (4 OR MORE VIEWS), LEFT (CPT=73564)      COMPARISON: None.     INDICATIONS: Left medial knee pain for several months without injury.     TECHNIQUE: 4 views were obtained.       FINDINGS:   No acute fracture or dislocation.  Small joint effusion.  Mild-to-moderate degenerative joint disease.  Soft tissues are unremarkable.              Impression: CONCLUSION:      No acute fracture or dislocation.     Small joint effusion.  Mild-to-moderate degenerative joint disease.           Dictated by (CST): Dante Dia MD on 10/26/2023 at 3:26 PM       Finalized by (CST): Dante Dia MD on 10/26/2023 at 3:28 PM              ASSESSMENT AND PLAN:  Kiko is a 69-year-old female presents with right knee pain which she rates 8 out of 10.  I believe her symptoms are due to osteoarthritis and patellofemoral syndrome.  I have ordered x-rays of the right knee, physical therapy, and a right knee hyaluronic acid and corticosteroid injection under ultrasound guidance which hopefully we will perform this week.  She will follow-up with me in about 2 months.     Assessment & Plan  Primary osteoarthritis of left knee  Chronic left knee pain due to primary osteoarthritis, worsening over three months. Previous injection provided four years of relief. Current symptoms include occasional clicking. X-rays from 2020 showed significant arthritis.  - Order standing, AP, lateral, tunnel, and sunrise x-rays of the left knee.  - Schedule hyaluronic acid injection under ultrasound guidance, pending insurance approval.  - If insurance denies, proceed with corticosteroid injection.  - Prescribe physical therapy to start after travel.  - Coordinate injection for Thursday, July 24th, pending insurance approval.  - Advise scheduling physical therapy sessions in advance.      RTC in 2 months  Discharge Instructions were provided as documented in AVS summary.  The patient was in agreement with the assessment and plan.  All questions were answered.  There were no barriers to learning.         1.  Patellofemoral pain syndrome, unspecified laterality    2. Primary osteoarthritis of knee, unspecified laterality    3. Sprain of medial collateral ligament of left knee, initial encounter    4. Age-related osteoporosis without current pathological fracture        Alex B. Behar MD  Physical Medicine and Rehabilitation/Sports Medicine  [facility]  21st [employer] C[employer] Act Notice to Patient: Medical documents like this are made available to patients in the interest of transparency. However, be advised this is a medical document and it is intended as [id number] communication between your medical providers. This medical document may contain abbreviations, assessments, medical data, and results or other terms that are unfamiliar. Medical documents are intended to carry relevant information, facts as evident, and the clinical opinion of the practitioner. As such, this medical document may be written in language that appears blunt or direct. You are encouraged to contact your medical provider and/or [facility] if you have any questions about this medical document.   Abridge tool was used for dictation purposes only and the patient was not recorded at any point during the visit.              [1] No past medical history on file.  [2] No past surgical history on file.  [3] No family history on file.  [4]   Current Outpatient Medications   Medication Sig Dispense Refill    alendronate 70 MG Oral Tab Take 1 tablet (70 mg total) by mouth once a week.      amLODIPine 5 MG Oral Tab Take 1 tablet (5 mg total) by mouth daily.      amoxicillin 500 MG Oral Tab Take 1 tablet (500 mg total) by mouth 2 (two) times daily.      levothyroxine 112 MCG Oral Tab Take 1 tablet (112 mcg total) by mouth daily.      Aspirin Buf,CaCarb-MgCarb-MgO, 81 MG Oral Tab Take 81 mg by mouth daily.      BIOTIN FORTE OR Take by mouth. (Patient not taking: Reported on 10/26/2023)      Multiple Vitamins-Minerals  (MULTI-VITAMIN/MINERALS) Oral Tab Take  by mouth.     [5] No Known Allergies

## 2025-07-21 NOTE — TELEPHONE ENCOUNTER
Per CMS Guidelines- no authorization is required for Left knee HA(MD Wilfrid's preferred) and corticosteroid injection USG.   CPT: 44172, ,   Dx: M17.10  Visco supplementation with Hyaluronans will only be covered for osteoarthritis of the knee or shoulder joint when radiological evidence to support the diagnosis of osteoarthritis and there is adequate documentation that simple pharmacological therapy (ie aspirin), or exercise and physical therapy has been tried and the patient has failed to response satisfactorily.     FYI- Synvisc One Is limited to osteoarthritis of the knee.     Status: authorization is not required based on medical necessity however may by subject to review once claim is submitted. E-verified.    Patient has been scheduled per provider on 7/24/2025 at 3pm.

## 2025-07-24 ENCOUNTER — HOSPITAL ENCOUNTER (OUTPATIENT)
Dept: GENERAL RADIOLOGY | Facility: HOSPITAL | Age: 70
Discharge: HOME | End: 2025-07-24
Attending: PHYSICAL MEDICINE & REHABILITATION
Payer: MEDICARE

## 2025-07-24 ENCOUNTER — OFFICE VISIT (OUTPATIENT)
Dept: PHYSICAL MEDICINE AND REHAB | Facility: CLINIC | Age: 70
End: 2025-07-24
Payer: MEDICARE

## 2025-07-24 DIAGNOSIS — M81.0 AGE-RELATED OSTEOPOROSIS WITHOUT CURRENT PATHOLOGICAL FRACTURE: ICD-10-CM

## 2025-07-24 DIAGNOSIS — M22.2X9 PATELLOFEMORAL PAIN SYNDROME, UNSPECIFIED LATERALITY: ICD-10-CM

## 2025-07-24 DIAGNOSIS — S83.412A SPRAIN OF MEDIAL COLLATERAL LIGAMENT OF LEFT KNEE, INITIAL ENCOUNTER: ICD-10-CM

## 2025-07-24 DIAGNOSIS — M17.10 PRIMARY OSTEOARTHRITIS OF KNEE, UNSPECIFIED LATERALITY: ICD-10-CM

## 2025-07-24 DIAGNOSIS — M17.9 OSTEOARTHRITIS OF KNEE, UNSPECIFIED: ICD-10-CM

## 2025-07-24 DIAGNOSIS — M17.10 PRIMARY OSTEOARTHRITIS OF KNEE, UNSPECIFIED LATERALITY: Primary | ICD-10-CM

## 2025-07-24 PROCEDURE — 73564 X-RAY EXAM KNEE 4 OR MORE: CPT | Performed by: PHYSICAL MEDICINE & REHABILITATION

## 2025-07-24 PROCEDURE — 20611 DRAIN/INJ JOINT/BURSA W/US: CPT | Performed by: PHYSICAL MEDICINE & REHABILITATION

## 2025-07-24 RX ORDER — TRIAMCINOLONE ACETONIDE 40 MG/ML
40 INJECTION, SUSPENSION INTRA-ARTICULAR; INTRAMUSCULAR ONCE
Status: COMPLETED | OUTPATIENT
Start: 2025-07-24 | End: 2025-07-24

## 2025-07-24 RX ORDER — LIDOCAINE HYDROCHLORIDE 10 MG/ML
7 INJECTION, SOLUTION INFILTRATION; PERINEURAL ONCE
Status: COMPLETED | OUTPATIENT
Start: 2025-07-24 | End: 2025-07-24

## 2025-07-24 RX ORDER — MELOXICAM 15 MG/1
15 TABLET ORAL DAILY PRN
Qty: 30 TABLET | Refills: 0 | Status: SHIPPED | OUTPATIENT
Start: 2025-07-24

## 2025-07-24 NOTE — PATIENT INSTRUCTIONS
Post Injection Instructions     Please do not do anything strenuous over the next two days (if you had a knee injection do not walk more than 2 city blocks, do not attend any aerobic classes, do not run, no heavy lifting, no prolong standing).  You may resume your day to day activities after your injection.  You may experience some mild amount of swelling after the procedure.  Please ice your joint that was injected at least 5-6 times a day (15 minutes) for two days after (this will help prevent worsening pain that sometimes occurs after an injection).  Only take tylenol if needed for pain for the first few days.  Watch for signs of infection which include redness, warmth, worsening pain, fevers or chills.  If you develop any of these signs call the office immediately at [phone]    Everyone responds differently to injections, but you can expect your peak effects a few weeks after your last injection.  Alex B. Behar MD  Physical Medicine and Rehabilitation/Sports Medicine  [facility]

## 2025-07-24 NOTE — PROCEDURES
[facility]   Knee Joint Injection Procedure Note    CHIEF COMPLAINT:    Chief Complaint   Patient presents with    Injection     Left knee Durolane and corticosteroid injection USG       PROCEDURE PERFORMED:  Right knee intra-articular Durolane and corticosteroid injection under ultrasound guidance    INDICATIONS:  Right knee pain and osteoarthritis    PRIMARY PROCEDURALIST:  Alex Behar, MD    INFORMED CONSENT & TIME OUT:   As documented in the Time Out and Pre-Procedure Check Lists.  Verbal consent was obtained    Vitals: [unfilled]  Labs (document last wbc, plts, hgb, and PT/INR):    No visits with results within 6 Month(s) from this visit.   Latest known visit with results is:   No results found for any previous visit.   ]    PROCEDURE:  The procedure, risks, benefits and alternatives were discussed with the patient.  Patient verbalized understanding and gave consent.  The Right knee was prepped and draped in the usual sterile fashion. Using a linear ultrasound probe, the superolateral patella-femoral joint space was visualized. Using a 30-gauge, 1/2-inch needle, 1 cc of 1% lidocaine was used to numb the skin and soft tissues in the supero-lateral approach.  The intra-articular space was then accessed using an 18-gauge, 1-1/2-inch needle, which was visualized on ultrasound, using approximately 5 cc of 1% lidocaine to numb the soft tissue.  Once the intra-articular space was visualized on ultrasound, with the needle accessing the space, the syringe was traded for an empty 10 cc syringe and aspiration was attempted. 7 cc of serous straw colored fluid was removed from the Right knee. The Durolane injection was attached to the needle and injected. During the injection, the Durolane was noted to enter the intra-articular space. The Durolane syringe was then switched out for a syringe containing 2 cc of 1% lidocaine and 1 cc of 40 mg/cc triamcinolone which was injected into  the same space. The needle was then removed and hemostasis was achieved.  Skin was cleansed and a dry dressing was placed.    Patient tolerated the procedure well and no immediate complications noted.       Patient verbalized understanding of assessment and plan.  Patient is in agreement with the plan.  All questions were answered.  No barriers to learning identified. Permanent pictures were saved in our PACS systeme.       INSTRUCTIONS GIVEN TO PATIENT:    \"You will see an effect in the next 2-3 days.  Please contact me if you have fevers, worsening swelling, worsening pain, decreased range of motion, increased redness, chills, or anything that makes you concerned about how the joint we injected feels/looks.  If you do not reach me in a reasonable time, please report directly to the emergency room for further evaluation\"    2 months     Alex B. Behar MD, [facility]  Physical Medicine and Rehabilitation/Sports Medicine  [facility]

## (undated) NOTE — LETTER
AUTHORIZATION FOR SURGICAL OPERATION OR OTHER PROCEDURE    1. I hereby authorize Dr. Isabella Aguillon and the [facility] staff assigned to my case to perform the following operation and/or procedure at the [facility]:    LEFT knee CSI under ultrasound guidance CPT 37102,      2. My physician has explained the nature and purpose of the operation or other procedure, possible alternative methods of treatment, the risks involved, and the possibility of complication to me. I acknowledge that no guarantee has been made as to the result that may be obtained. 3.  I recognize that, during the course of this operation, or other procedure, unforseen conditions may necessitate additional or different procedure than those listed above. I, therefore, further authorize and request that the above named physician, his/her physician assistants or designees perform such procedures as are, in his/her professional opinion, necessary and desirable. 4.  Any tissue or organs removed in the operation or other procedure may be disposed of by and at the discretion of the [facility] staff and [facility]. 5.  I understand that in the event of a medical emergency, I will be transported by local paramedics to [facility] or other hospitals emergency department. 6.  I certify that I have read and fully understand the above consent to operation and/or other procedure. 7.  I acknowledge that my physician has explained sedation/analgesia administration to me including the risks and benefits. I consent to the administration of sedation/analgesia as may be necessary or desirable in the judgement of my physician. Witness signature: ___________________________________________________ Date:  ______/______/_____                    Time:  ________ A. M.  P.M.        Patient Name:  Wilfredo Pedro  11/4/1955  [id number]         Patient signature: ___________________________________________________               Statement of Physician  My signature below affirms that prior to the time of the procedure, I have explained to the patient and/or his/her guardian, the risks and benefits involved in the proposed treatment and any reasonable alternative to the proposed treatment. I have also explained the risks and benefits involved in the refusal of the proposed treatment and have answered the patient's questions.                         Date:  ______/______/_______  Provider                      Signature:  __________________________________________________________       Time:  ___________ AALONDRA BURGOS

## (undated) NOTE — LETTER
AUTHORIZATION FOR SURGICAL OPERATION OR OTHER PROCEDURE    1. I hereby authorize Dr. Alex Behar and the [facility] staff assigned to my case to perform the following operation and/or procedure at the [facility]:    _______________________________________________________________________________________________     Left knee Durolane and corticosteroid injection USG  _______________________________________________________________________________________________    2.  My physician has explained the nature and purpose of the operation or other procedure, possible alternative methods of treatment, the risks involved, and the possibility of complication to me.  I acknowledge that no guarantee has been made as to the result that may be obtained.  3.  I recognize that, during the course of this operation, or other procedure, unforseen conditions may necessitate additional or different procedure than those listed above.  I, therefore, further authorize and request that the above named physician, his/her physician assistants or designees perform such procedures as are, in his/her professional opinion, necessary and desirable.  4.  Any tissue or organs removed in the operation or other procedure may be disposed of by and at the discretion of the [facility] staff and [facility].  5.  I understand that in the event of a medical emergency, I will be transported by local paramedics to [facility] or other hospital emergency department.  6.  I certify that I have read and fully understand the above consent to operation and/or other procedure.    7.  I acknowledge that my physician has explained sedation/analgesia administration to me including the risks and benefits.  I consent to the administration of sedation/analgesia as may be necessary or desirable in the judgement of my physician.    Witness signature: ___________________________________________________ Date:  ______/______/_____                     Time:  ________ A.M.  P.M.       Patient Name:    Kiko Gramajo  11/4/1955  [id number]     Patient signature:  ___________________________________________________    Statement of Physician  My signature below affirms that prior to the time of the procedure, I have explained to the patient and/or his/her guardian, the risks and benefits involved in the proposed treatment and any reasonable alternative to the proposed treatment.  I have also explained the risks and benefits involved in the refusal of the proposed treatment and have answered the patient's questions.                        Date:  ______/______/_______  Provider                      Signature:  __________________________________________________________       Time:  ___________ A.M    P.M.

## (undated) NOTE — LETTER
AUTHORIZATION FOR SURGICAL OPERATION OR OTHER PROCEDURE    1.  I hereby authorize Dr. Virgie Morales  and the [facility] staff assigned to my case to perform the following operation and/or procedure at the [facility]:     RIGHT knee HA and CSI under ultrasound anna signature below affirms that prior to the time of the procedure, I have explained to the patient and/or his/her guardian, the risks and benefits involved in the proposed treatment and any reasonable alternative to the proposed treatment.   I have also expla

## (undated) NOTE — LETTER
NIECY Notifier: [employer]. Patient Name: Kiko Gramajo Identification Number: [id number]                          Advance Beneficiary Notice of Noncoverage (ABN)   NOTE:  If Medicare doesn’t pay for D. item/service(s) below, you may have to pay.  Medicare does not pay for everything, even some care that you or your health care provider have good reason to think you need. We expect Medicare may not pay for the D. item/service(s) below.  D. Items or Services E. Reason Medicare May Not Pay: F. Estimated Cost   __ EKG ($87.00)  __ Pap smear ($101) __Pelvic/Breast ($147.00)  __ Ear Irrigation ($138)  _x_ Injection(s)  Left knee Durolane and corticosteroid injection USG  ___ Tdap ($181)       ___ Meningitis ($290)   __Prevnar ($555)  ___ Td ($66)              ___ Prevnar 20 ($549)  ___ Hep A ($152)     ___ Prolia ($1827)         __ Xiaflex ($            )   ___ Hep B ($150)     ___ Pneumovax ($287)                                         ___ Vaccine Administration ($65)   __ Medicare does not cover this service      __ Medicare may not pay for this   item/service for your condition     __ Medicare may not pay for this item/service as often as this        WHAT YOU NEED TO DO NOW:  Read this notice, so you can make an informed decision about your care.  Ask us any questions that you may have after you finish reading.  Choose an option below about whether to receive the D. item/service(s) listed above.  Note: If you choose Option 1 or 2, we may help you to use any other insurance that you might have, but Medicare cannot require us to do this.  G. OPTIONS: Check only one box.  We cannot choose a box for you.   OPTION 1. I want the D. item/service(s) listed above. You may ask to be paid now, but I also want Medicare billed for an official decision on payment, which is sent to me on a Medicare Summary Notice (MSN). I understand that if Medicare doesn’t pay, I am responsible for payment, but I can appeal  to Medicare by following the directions on the MSN. If Medicare does pay, you will refund any payments I made to you, less co-pays or deductibles.  OPTION 2. I want the D. item/service(s) listed above, but do not bill Medicare. You may ask to be paid now as I am responsible for payment. I cannot appeal if Medicare is not billed.  OPTION 3. I don't want the D. item/service(s) listed above. I understand with this choice I am not responsible for payment, and I cannot appeal to see if Medicare would pay.    H. Additional Information:    This notice gives our opinion, not an official Medicare decision. If you have other questions on this notice or Medicare billing, call 1-800-MEDICARE (1-454.544.7524/TTY: [phone]). Signing below means that you have received and understand this notice. You also receive a copy.  I. Signature: J. Date:       You have the right to get Medicare information in an accessible format, like large print, Braille, or audio. You also have the right to file a complaint if you feel you’ve been discriminated against. Visit Medicare.gov/about- us/tevkcyrreeska-mblsuskzljyzzybph-vzslkh.  According to the Paperwork Reduction Act of 1995, no persons are required to respond to a collection of information unless it displays a valid OMB control number. The valid OMB control number for this information collection is 9800-7083. The time required to complete this information collection is estimated to average 7 minutes per response, including the time to review instructions, search existing data resources, gather the data needed, and complete and review the information collection. If you have comments concerning the accuracy of the time estimate or suggestions for improving this form, please write to: CMS, [facility]     [place]bel Attn: AMIRA Reports Clearance Officer, [place], [place] [address].  Form CMS-R-131 (Exp. 1/31/2026) Form Approved OMB No. [id number]

## (undated) NOTE — LETTER
[address] [facility], [address] Dneisha Bravo [phone]  Fax:[phone]         Patient Information:  Patient Name:   Address: Lauren Haney, [phone] [place] Funes 30-41-24-27 (home)  Sex: female          : 1955   ________________________________________________________________  Referral Information:  Order Date: Oct 30, 2023  Expected Date: 10/30/2023  Expiration Date: Oct 30, 2024 Epic Order #: [id number]   Referral Type: MRI KNEE, LEFT (CPT=73721) Dx: Patellofemoral pain syndrome, unspecified laterality (M22.2X9)  Primary osteoarthritis of knee, unspecified laterality (M17.10)  Sprain of medial collateral ligament of left knee, initial encounter (Y25.963T)   Signed Referral [place]:     Release to patient: Immediate  Comments: MRI of left knee without contrast.  Number of Visits: 1           ______________________________________________________________  Scheduling Information:  Your order will generate a \"Scheduling Ticket\" that will be available in [employer] to schedule on your own at a time most convenient to you. To ensure you receive your test in a timely manner, STAT orders will not generate a ticket and must be scheduled by calling the Central Scheduling department. Your physician has ordered a radiology test that may require authorization from your insurance company.   Your physician or the clinic staff will work with your insurance company to obtain this authorization for your ordered radiology test.     If you do not have a [employer] Account, or if you prefer to speak with someone to schedule your appointment, please call Lauri Zamudio at [phone].     _______________________________________________________        Coverage Information:      Primary Visit Coverage        Payer Plan Sponsor Code Group Number Group Name     MEDICARE MEDICARE PART B ONLY Primary Visit Coverage Subscriber        ID Name [facility] Address     [id number] LEXUS BANUELOS [id number]           [place], [address]                      Secondary Visit Coverage        Payer Plan Sponsor Code Group Number Group Name     MEDICARE MEDICARE PART A&B                            Secondary Visit Coverage Subscriber        ID Name [facility] Address     [id number] [address] [id number]           [place], [address]                 Ordering: Tiana Alanis RN  Encounter Provider: Nolberto Noble MD  Order Authorizing Provider: Nolberto Noble MD   Order Date: Oct 30, 2023 at 3:46 PM  Ordering Department: [facility]

## (undated) NOTE — LETTER
DOYLE. Notifier: [employer]   REHANA. Patient Name: Jaqueline Amador. Identification Number: [id number]      Advance Beneficiary Notice of Noncoverage (ABN)  NOTE:  If Medicare doesn’t pay for D. Item/service(s) below, you may have to pay.   Medicare do responsible for payment, and I cannot appeal to see if Medicare would pay. H. Additional Information: This notice gives our opinion, not an official Medicare decision.  If you have other questions on this notice or Medicare billing, call 1-800-MEDICARE

## (undated) NOTE — LETTER
AUTHORIZATION FOR SURGICAL OPERATION OR OTHER PROCEDURE    1. I hereby authorize Dr. Alex Behar and the [facility] staff assigned to my case to perform the following operation and/or procedure at the [facility]:    _______________________________________________________________________________________________    Right knee Durolane and Corticosteroid injection under ultrasound guidance  _______________________________________________________________________________________________    2.  My physician has explained the nature and purpose of the operation or other procedure, possible alternative methods of treatment, the risks involved, and the possibility of complication to me.  I acknowledge that no guarantee has been made as to the result that may be obtained.  3.  I recognize that, during the course of this operation, or other procedure, unforseen conditions may necessitate additional or different procedure than those listed above.  I, therefore, further authorize and request that the above named physician, his/her physician assistants or designees perform such procedures as are, in his/her professional opinion, necessary and desirable.  4.  Any tissue or organs removed in the operation or other procedure may be disposed of by and at the discretion of the [facility] staff and [facility].  5.  I understand that in the event of a medical emergency, I will be transported by local paramedics to [facility] or other hospital emergency department.  6.  I certify that I have read and fully understand the above consent to operation and/or other procedure.    7.  I acknowledge that my physician has explained sedation/analgesia administration to me including the risks and benefits.  I consent to the administration of sedation/analgesia as may be necessary or desirable in the judgement of my physician.    Witness signature: ___________________________________________________ Date:   ______/______/_____                    Time:  ________ A.M.  P.M.       Patient Name:    Kiko Gramajo  11/4/1955  [id number]     Patient signature:  ___________________________________________________      Statement of Physician  My signature below affirms that prior to the time of the procedure, I have explained to the patient and/or his/her guardian, the risks and benefits involved in the proposed treatment and any reasonable alternative to the proposed treatment.  I have also explained the risks and benefits involved in the refusal of the proposed treatment and have answered the patient's questions.                        Date:  ______/______/_______  Provider                      Signature:  __________________________________________________________       Time:  ___________ A.M    P.M.